# Patient Record
Sex: FEMALE | Race: WHITE | HISPANIC OR LATINO | Employment: OTHER | URBAN - METROPOLITAN AREA
[De-identification: names, ages, dates, MRNs, and addresses within clinical notes are randomized per-mention and may not be internally consistent; named-entity substitution may affect disease eponyms.]

---

## 2020-10-29 ENCOUNTER — OFFICE VISIT (OUTPATIENT)
Dept: URBAN - METROPOLITAN AREA CLINIC 27 | Facility: CLINIC | Age: 70
End: 2020-10-29

## 2020-10-29 PROBLEM — 442461003 HISTORY OF EXCISION OF INTESTINAL STRUCTURE: Status: ACTIVE | Noted: 2020-10-29

## 2020-10-29 PROBLEM — 79922009 EPIGASTRIC PAIN: Status: ACTIVE | Noted: 2020-10-29

## 2020-10-29 PROBLEM — 422587007 NAUSEA: Status: ACTIVE | Noted: 2020-10-29

## 2020-10-30 ENCOUNTER — LAB OUTSIDE AN ENCOUNTER (OUTPATIENT)
Dept: URBAN - METROPOLITAN AREA CLINIC 121 | Facility: CLINIC | Age: 70
End: 2020-10-30

## 2020-10-30 LAB — HGBA1C: (no result)

## 2020-11-19 ENCOUNTER — OFFICE VISIT (OUTPATIENT)
Dept: URBAN - METROPOLITAN AREA SURGERY CENTER 7 | Facility: SURGERY CENTER | Age: 70
End: 2020-11-19

## 2021-01-12 ENCOUNTER — OFFICE VISIT (OUTPATIENT)
Dept: URBAN - METROPOLITAN AREA CLINIC 27 | Facility: CLINIC | Age: 71
End: 2021-01-12

## 2021-03-15 ENCOUNTER — OFFICE VISIT (OUTPATIENT)
Dept: URBAN - METROPOLITAN AREA CLINIC 27 | Facility: CLINIC | Age: 71
End: 2021-03-15

## 2021-03-22 ENCOUNTER — OFFICE VISIT (OUTPATIENT)
Dept: URBAN - METROPOLITAN AREA SURGERY CENTER 7 | Facility: SURGERY CENTER | Age: 71
End: 2021-03-22

## 2021-04-27 ENCOUNTER — OFFICE VISIT (OUTPATIENT)
Dept: URBAN - METROPOLITAN AREA CLINIC 27 | Facility: CLINIC | Age: 71
End: 2021-04-27

## 2022-04-30 ENCOUNTER — TELEPHONE ENCOUNTER (OUTPATIENT)
Dept: URBAN - METROPOLITAN AREA CLINIC 121 | Facility: CLINIC | Age: 72
End: 2022-04-30

## 2022-04-30 RX ORDER — CYANOCOBALAMIN (VITAMIN B-12) 2500 MCG
QD TABLET, SUBLINGUAL SUBLINGUAL
OUTPATIENT
Start: 2014-03-25

## 2022-04-30 RX ORDER — EMPAGLIFLOZIN 25 MG/1
TABLET, FILM COATED ORAL
OUTPATIENT
Start: 2016-03-23 | End: 2020-10-29

## 2022-04-30 RX ORDER — GLYBURIDE 5 MG/1
TABLET ORAL
OUTPATIENT
Start: 2007-11-19

## 2022-04-30 RX ORDER — ESOMEPRAZOLE MAGNESIUM 40 MG
1 PO BID CAPSULE,DELAYED RELEASE (ENTERIC COATED) ORAL
OUTPATIENT
Start: 2007-11-28

## 2022-04-30 RX ORDER — RALOXIFENE HYDROCHLORIDE 60 MG/1
TABLET, FILM COATED ORAL
OUTPATIENT
Start: 2016-03-23

## 2022-04-30 RX ORDER — FLUOXETINE HYDROCHLORIDE 20 MG/1
TABLET ORAL
OUTPATIENT
Start: 2007-11-19

## 2022-04-30 RX ORDER — ESTRADIOL 1 MG/1
TABLET ORAL
OUTPATIENT
Start: 2007-11-19

## 2022-04-30 RX ORDER — ESTRADIOL 1 MG/1
TABLET ORAL
OUTPATIENT
Start: 2007-11-19 | End: 2013-11-21

## 2022-04-30 RX ORDER — PRAVASTATIN SODIUM 10 MG/1
TABLET ORAL
OUTPATIENT
Start: 2020-10-29 | End: 2021-03-15

## 2022-04-30 RX ORDER — MULTIVIT-MIN/FA/LYCOPEN/LUTEIN .4-300-25
TABLET ORAL
OUTPATIENT
Start: 2007-11-19

## 2022-04-30 RX ORDER — ESOMEPRAZOLE MAGNESIUM 40 MG
CAPSULE,DELAYED RELEASE (ENTERIC COATED) ORAL
OUTPATIENT
Start: 2007-11-19

## 2022-04-30 RX ORDER — CYANOCOBALAMIN (VITAMIN B-12) 2500 MCG
QD TABLET, SUBLINGUAL SUBLINGUAL
OUTPATIENT
Start: 2014-03-25 | End: 2020-10-29

## 2022-04-30 RX ORDER — RALOXIFENE HYDROCHLORIDE 60 MG/1
TABLET, FILM COATED ORAL
OUTPATIENT
Start: 2016-03-23 | End: 2020-10-29

## 2022-04-30 RX ORDER — CYANOCOBALAMIN 1000 UG/ML
INJECTION INTRAMUSCULAR; SUBCUTANEOUS
OUTPATIENT
Start: 2016-03-23 | End: 2020-10-29

## 2022-04-30 RX ORDER — LUTEIN 18.6MG
CAPSULE ORAL
OUTPATIENT
Start: 2008-02-14 | End: 2013-11-21

## 2022-04-30 RX ORDER — ESOMEPRAZOLE MAGNESIUM 40 MG
1 PO BID CAPSULE,DELAYED RELEASE (ENTERIC COATED) ORAL
OUTPATIENT
Start: 2007-11-28 | End: 2013-11-21

## 2022-04-30 RX ORDER — CYANOCOBALAMIN 1000 UG/ML
INJECTION INTRAMUSCULAR; SUBCUTANEOUS
OUTPATIENT
Start: 2016-03-23

## 2022-04-30 RX ORDER — EMPAGLIFLOZIN 25 MG/1
TABLET, FILM COATED ORAL
OUTPATIENT
Start: 2016-03-23

## 2022-04-30 RX ORDER — OXYBUTYNIN CHLORIDE 10 MG/1
TABLET, EXTENDED RELEASE ORAL
OUTPATIENT
Start: 2016-03-23 | End: 2020-10-29

## 2022-04-30 RX ORDER — LUTEIN 18.6MG
CAPSULE ORAL
OUTPATIENT
Start: 2008-02-14

## 2022-04-30 RX ORDER — PRAVASTATIN SODIUM 10 MG/1
TABLET ORAL
OUTPATIENT
Start: 2020-10-29

## 2022-04-30 RX ORDER — METFORMIN HCL 500 MG/1
TABLET ORAL
OUTPATIENT
Start: 2007-11-19

## 2022-04-30 RX ORDER — GLYBURIDE 5 MG/1
TABLET ORAL
OUTPATIENT
Start: 2007-11-19 | End: 2013-11-21

## 2022-04-30 RX ORDER — CYANOCOBALAMIN (VITAMIN B-12) 2500 MCG
TABLET, SUBLINGUAL SUBLINGUAL
OUTPATIENT
Start: 2007-11-19

## 2022-04-30 RX ORDER — OXYBUTYNIN CHLORIDE 10 MG/1
TABLET, EXTENDED RELEASE ORAL
OUTPATIENT
Start: 2016-03-23

## 2022-05-01 ENCOUNTER — TELEPHONE ENCOUNTER (OUTPATIENT)
Dept: URBAN - METROPOLITAN AREA CLINIC 121 | Facility: CLINIC | Age: 72
End: 2022-05-01

## 2022-05-01 RX ORDER — LIRAGLUTIDE 6 MG/ML
BID INJECTION SUBCUTANEOUS
Status: ACTIVE | COMMUNITY
Start: 2013-11-21

## 2022-05-01 RX ORDER — PANTOPRAZOLE SODIUM 40 MG/1
TAKE 1 TABLET DAILY 30 MINUTES BEFORE BREAKFAST TABLET, DELAYED RELEASE ORAL
Status: ACTIVE | COMMUNITY
Start: 2021-12-03

## 2022-05-01 RX ORDER — OMEGA-3S/DHA/EPA/FISH OIL 980-1400MG
CAPSULE,DELAYED RELEASE (ENTERIC COATED) ORAL
Status: ACTIVE | COMMUNITY
Start: 2020-10-29

## 2022-05-01 RX ORDER — BIOTIN 10 MG
TABLET ORAL
Status: ACTIVE | COMMUNITY
Start: 2016-03-23

## 2022-05-01 RX ORDER — DENOSUMAB 60 MG/ML
INJECTION SUBCUTANEOUS
Status: ACTIVE | COMMUNITY
Start: 2020-10-29

## 2022-05-01 RX ORDER — ATORVASTATIN CALCIUM 40 MG/1
TABLET, FILM COATED ORAL
Status: ACTIVE | COMMUNITY
Start: 2021-01-12

## 2022-05-01 RX ORDER — FLUOXETINE HYDROCHLORIDE 20 MG/1
QD TABLET ORAL
Status: ACTIVE | COMMUNITY
Start: 2014-03-25

## 2022-05-01 RX ORDER — LINACLOTIDE 145 UG/1
1 CAPSULE PO QAM 30 MINS BEFORE EATING CAPSULE, GELATIN COATED ORAL
Status: ACTIVE | COMMUNITY
Start: 2021-01-19

## 2022-05-01 RX ORDER — VIT C/HESPERIDIN/BIOFLAVONOIDS 500-100 MG
TABLET ORAL
Status: ACTIVE | COMMUNITY
Start: 2020-10-29

## 2022-05-01 RX ORDER — ISOSORBIDE DINITRATE 30 MG/1
TABLET ORAL
Status: ACTIVE | COMMUNITY
Start: 2021-01-12

## 2022-05-01 RX ORDER — CHOLECALCIFEROL (VITAMIN D3) 125 MCG
CAPSULE ORAL
Status: ACTIVE | COMMUNITY
Start: 2020-10-29

## 2022-05-01 RX ORDER — ONDANSETRON HYDROCHLORIDE 4 MG/1
1 TABLET PO Q6H PRN FOR NAUSEA TABLET, FILM COATED ORAL
Status: ACTIVE | COMMUNITY
Start: 2020-10-29

## 2022-05-01 RX ORDER — LINACLOTIDE 145 UG/1
TAKE 1 CAPSULE EVERY MORNING 30 MINUTES BEFORE EATING CAPSULE, GELATIN COATED ORAL
Status: ACTIVE | COMMUNITY
Start: 2022-02-01

## 2022-05-01 RX ORDER — METFORMIN HCL 500 MG/1
QD TABLET ORAL
Status: ACTIVE | COMMUNITY
Start: 2014-03-25

## 2022-05-01 RX ORDER — MULTIVIT-MIN/FA/LYCOPEN/LUTEIN .4-300-25
QD TABLET ORAL
Status: ACTIVE | COMMUNITY
Start: 2014-03-25

## 2022-06-07 ENCOUNTER — TELEPHONE ENCOUNTER (OUTPATIENT)
Dept: URBAN - METROPOLITAN AREA CLINIC 27 | Facility: CLINIC | Age: 72
End: 2022-06-07

## 2022-06-07 RX ORDER — LINACLOTIDE 145 UG/1
TAKE 1 CAPSULE EVERY MORNING 30 MINUTES BEFORE EATING CAPSULE, GELATIN COATED ORAL ONCE A DAY
Qty: 90 | Refills: 3
Start: 2022-02-01 | End: 2023-06-02

## 2022-08-26 ENCOUNTER — TELEPHONE ENCOUNTER (OUTPATIENT)
Dept: URBAN - METROPOLITAN AREA CLINIC 27 | Facility: CLINIC | Age: 72
End: 2022-08-26

## 2022-10-31 ENCOUNTER — LAB OUTSIDE AN ENCOUNTER (OUTPATIENT)
Dept: URBAN - METROPOLITAN AREA CLINIC 27 | Facility: CLINIC | Age: 72
End: 2022-10-31

## 2022-10-31 ENCOUNTER — WEB ENCOUNTER (OUTPATIENT)
Dept: URBAN - METROPOLITAN AREA CLINIC 27 | Facility: CLINIC | Age: 72
End: 2022-10-31

## 2022-10-31 ENCOUNTER — OFFICE VISIT (OUTPATIENT)
Dept: URBAN - METROPOLITAN AREA CLINIC 27 | Facility: CLINIC | Age: 72
End: 2022-10-31
Payer: MEDICARE

## 2022-10-31 VITALS
SYSTOLIC BLOOD PRESSURE: 120 MMHG | RESPIRATION RATE: 17 BRPM | WEIGHT: 117 LBS | HEART RATE: 72 BPM | HEIGHT: 59 IN | DIASTOLIC BLOOD PRESSURE: 68 MMHG | BODY MASS INDEX: 23.59 KG/M2

## 2022-10-31 DIAGNOSIS — R19.4 CHANGE IN BOWEL HABITS: ICD-10-CM

## 2022-10-31 DIAGNOSIS — M54.50 LOW BACK PAIN, UNSPECIFIED BACK PAIN LATERALITY, UNSPECIFIED CHRONICITY, UNSPECIFIED WHETHER SCIATICA PRESENT: ICD-10-CM

## 2022-10-31 DIAGNOSIS — Z86.010 PERSONAL HISTORY OF COLONIC POLYPS: ICD-10-CM

## 2022-10-31 PROCEDURE — 99214 OFFICE O/P EST MOD 30 MIN: CPT | Performed by: PHYSICIAN ASSISTANT

## 2022-10-31 RX ORDER — CHOLECALCIFEROL (VITAMIN D3) 125 MCG
CAPSULE ORAL
Status: ACTIVE | COMMUNITY
Start: 2020-10-29

## 2022-10-31 RX ORDER — FLUOXETINE HYDROCHLORIDE 20 MG/1
QD TABLET ORAL
Status: ACTIVE | COMMUNITY
Start: 2014-03-25

## 2022-10-31 RX ORDER — OMEGA-3S/DHA/EPA/FISH OIL 980-1400MG
CAPSULE,DELAYED RELEASE (ENTERIC COATED) ORAL
Status: ACTIVE | COMMUNITY
Start: 2020-10-29

## 2022-10-31 RX ORDER — ISOSORBIDE DINITRATE 30 MG/1
TABLET ORAL
Status: ACTIVE | COMMUNITY
Start: 2021-01-12

## 2022-10-31 RX ORDER — LINACLOTIDE 145 UG/1
TAKE 1 CAPSULE EVERY MORNING 30 MINUTES BEFORE EATING CAPSULE, GELATIN COATED ORAL ONCE A DAY
Qty: 90 | Refills: 3 | Status: ACTIVE | COMMUNITY
Start: 2022-02-01 | End: 2023-06-02

## 2022-10-31 RX ORDER — ONDANSETRON HYDROCHLORIDE 4 MG/1
1 TABLET PO Q6H PRN FOR NAUSEA TABLET, FILM COATED ORAL
Status: ACTIVE | COMMUNITY
Start: 2020-10-29

## 2022-10-31 RX ORDER — DENOSUMAB 60 MG/ML
INJECTION SUBCUTANEOUS
Status: ACTIVE | COMMUNITY
Start: 2020-10-29

## 2022-10-31 RX ORDER — VIT C/HESPERIDIN/BIOFLAVONOIDS 500-100 MG
TABLET ORAL
Status: ACTIVE | COMMUNITY
Start: 2020-10-29

## 2022-10-31 RX ORDER — PANTOPRAZOLE SODIUM 40 MG/1
TAKE 1 TABLET DAILY 30 MINUTES BEFORE BREAKFAST TABLET, DELAYED RELEASE ORAL
Status: ACTIVE | COMMUNITY
Start: 2021-12-03

## 2022-10-31 RX ORDER — MULTIVIT-MIN/FA/LYCOPEN/LUTEIN .4-300-25
QD TABLET ORAL
Status: ACTIVE | COMMUNITY
Start: 2014-03-25

## 2022-10-31 RX ORDER — LIRAGLUTIDE 6 MG/ML
BID INJECTION SUBCUTANEOUS
Status: ACTIVE | COMMUNITY
Start: 2013-11-21

## 2022-10-31 RX ORDER — ATORVASTATIN CALCIUM 40 MG/1
TABLET, FILM COATED ORAL
Status: ACTIVE | COMMUNITY
Start: 2021-01-12

## 2022-10-31 RX ORDER — METFORMIN HCL 500 MG/1
QD TABLET ORAL
Status: ACTIVE | COMMUNITY
Start: 2014-03-25

## 2022-10-31 RX ORDER — BIOTIN 10 MG
TABLET ORAL
Status: ACTIVE | COMMUNITY
Start: 2016-03-23

## 2022-10-31 NOTE — HPI-TODAY'S VISIT:
Ms. Zazueta is a 72-year-old female patient of Dr. Cano presenting for evaluation of severe back pain. She developed back pain after moving houses, lifting heavy boxes in early September. She went to her PCP, had spinal x-rays (normal) and was prescribed a muscle relaxer. Her upper/mid back pain has resolved, she continues to have sharp, intermittent lower back pain. Pain is worse standing up straight and certain mvmts. Pain improves if she passes a BM or gas, so she thinks there may be a GI etiology. She is using a cane for fear the pain may make her fall. Constipation is well-controlled on Linzess 145 QD, though recently she has had mushy, sticky, formless stools. No rectal bleeding or weight loss. She recently started a new med for osteoporosis (Evenity). I have reviewed labs from Sept. showing normal CMP, A1c of 6.7.  She is s/p gastric bypass in 2001 and prior cholecystectomy.    Colonoscopy March 2021:1 small polyp; 3-year recall with extended prep given his serrated adenoma on prior colonoscopy EGD 2020:Normal anastomosis UGI and SBFT March 2021:Mild reflux, otherwise unremarkable. CT ab/pel November 2020:Gastric bypass, small hiatal hernia, constipation

## 2022-11-02 ENCOUNTER — ERX REFILL RESPONSE (OUTPATIENT)
Dept: URBAN - METROPOLITAN AREA CLINIC 27 | Facility: CLINIC | Age: 72
End: 2022-11-02

## 2022-11-02 RX ORDER — PANTOPRAZOLE SODIUM 40 MG/1
TAKE 1 TABLET DAILY 30 MINUTES BEFORE BREAKFAST TABLET, DELAYED RELEASE ORAL
Qty: 90 TABLET | Refills: 4 | OUTPATIENT

## 2022-11-02 RX ORDER — PANTOPRAZOLE SODIUM 40 MG/1
TAKE 1 TABLET DAILY 30 MINUTES BEFORE BREAKFAST TABLET, DELAYED RELEASE ORAL
Qty: 90 TABLET | Refills: 3 | OUTPATIENT

## 2022-11-03 ENCOUNTER — LAB OUTSIDE AN ENCOUNTER (OUTPATIENT)
Dept: URBAN - METROPOLITAN AREA CLINIC 27 | Facility: CLINIC | Age: 72
End: 2022-11-03

## 2022-11-08 LAB — OCCULT BLOOD, FECAL, IA: (no result)

## 2022-11-15 ENCOUNTER — WEB ENCOUNTER (OUTPATIENT)
Dept: URBAN - METROPOLITAN AREA CLINIC 27 | Facility: CLINIC | Age: 72
End: 2022-11-15

## 2022-11-17 ENCOUNTER — OFFICE VISIT (OUTPATIENT)
Dept: URBAN - METROPOLITAN AREA CLINIC 27 | Facility: CLINIC | Age: 72
End: 2022-11-17
Payer: MEDICARE

## 2022-11-17 VITALS
WEIGHT: 106 LBS | HEART RATE: 79 BPM | BODY MASS INDEX: 21.37 KG/M2 | SYSTOLIC BLOOD PRESSURE: 130 MMHG | HEIGHT: 59 IN | DIASTOLIC BLOOD PRESSURE: 70 MMHG

## 2022-11-17 DIAGNOSIS — Z98.84 BARIATRIC SURGERY STATUS: ICD-10-CM

## 2022-11-17 DIAGNOSIS — K21.9 GASTRO-ESOPHAGEAL REFLUX DISEASE WITHOUT ESOPHAGITIS: ICD-10-CM

## 2022-11-17 DIAGNOSIS — R19.4 CHANGE IN BOWEL HABITS: ICD-10-CM

## 2022-11-17 PROCEDURE — 99213 OFFICE O/P EST LOW 20 MIN: CPT | Performed by: INTERNAL MEDICINE

## 2022-11-17 RX ORDER — CHOLECALCIFEROL (VITAMIN D3) 125 MCG
CAPSULE ORAL
Status: ACTIVE | COMMUNITY
Start: 2020-10-29

## 2022-11-17 RX ORDER — BIOTIN 10 MG
TABLET ORAL
Status: ACTIVE | COMMUNITY
Start: 2016-03-23

## 2022-11-17 RX ORDER — LINACLOTIDE 145 UG/1
TAKE 1 CAPSULE EVERY MORNING 30 MINUTES BEFORE EATING CAPSULE, GELATIN COATED ORAL ONCE A DAY
Qty: 90 | Refills: 3 | Status: ACTIVE | COMMUNITY
Start: 2022-02-01 | End: 2023-06-02

## 2022-11-17 RX ORDER — PANTOPRAZOLE SODIUM 40 MG/1
TAKE 1 TABLET DAILY 30 MINUTES BEFORE BREAKFAST TABLET, DELAYED RELEASE ORAL
Qty: 90 TABLET | Refills: 3 | Status: ACTIVE | COMMUNITY

## 2022-11-17 RX ORDER — LIRAGLUTIDE 6 MG/ML
BID INJECTION SUBCUTANEOUS
Status: ACTIVE | COMMUNITY
Start: 2013-11-21

## 2022-11-17 RX ORDER — OMEGA-3S/DHA/EPA/FISH OIL 980-1400MG
CAPSULE,DELAYED RELEASE (ENTERIC COATED) ORAL
Status: ACTIVE | COMMUNITY
Start: 2020-10-29

## 2022-11-17 RX ORDER — VIT C/HESPERIDIN/BIOFLAVONOIDS 500-100 MG
TABLET ORAL
Status: ACTIVE | COMMUNITY
Start: 2020-10-29

## 2022-11-17 RX ORDER — ATORVASTATIN CALCIUM 40 MG/1
TABLET, FILM COATED ORAL
Status: ACTIVE | COMMUNITY
Start: 2021-01-12

## 2022-11-17 RX ORDER — ISOSORBIDE DINITRATE 30 MG/1
TABLET ORAL
Status: ACTIVE | COMMUNITY
Start: 2021-01-12

## 2022-11-17 RX ORDER — ONDANSETRON HYDROCHLORIDE 4 MG/1
1 TABLET PO Q6H PRN FOR NAUSEA TABLET, FILM COATED ORAL
Status: ACTIVE | COMMUNITY
Start: 2020-10-29

## 2022-11-17 RX ORDER — DENOSUMAB 60 MG/ML
INJECTION SUBCUTANEOUS
Status: ACTIVE | COMMUNITY
Start: 2020-10-29

## 2022-11-17 RX ORDER — FLUOXETINE HYDROCHLORIDE 20 MG/1
QD TABLET ORAL
Status: ACTIVE | COMMUNITY
Start: 2014-03-25

## 2022-11-17 RX ORDER — MULTIVIT-MIN/FA/LYCOPEN/LUTEIN .4-300-25
QD TABLET ORAL
Status: ACTIVE | COMMUNITY
Start: 2014-03-25

## 2022-11-17 RX ORDER — METFORMIN HCL 500 MG/1
QD TABLET ORAL
Status: ACTIVE | COMMUNITY
Start: 2014-03-25

## 2022-11-17 NOTE — EXAM-PHYSICAL EXAM
Palpation in the area of the lower back above the coccygeal region is where she is tender but there is no point tenderness

## 2022-11-17 NOTE — HPI-TODAY'S VISIT:
ThisIs a 72-year-old female seen in consultation for her stabbing pain in her lower back above her coccyx region.  She had an MRI of the pelvis which showed arthritis.  And some changes within the spinal column.  She states the discomfort is worse when stretching such as sitting up or sitting down.  Or movement.  It all began when she was moving a box and turning.  She has chronic constipation and uses Linzess.  She does feel that sometimes with a good bowel movement she will have some relief of the pressure.  She has reflux on PPI which is stable.  Her bowel movements have not changed recently.  Last colonoscopy 2021.  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

## 2022-11-18 PROBLEM — 129851009: Status: ACTIVE | Noted: 2022-10-31

## 2022-11-18 PROBLEM — 608848006 HISTORY OF BARIATRIC SURGICAL PROCEDURE: Status: ACTIVE | Noted: 2020-10-29

## 2023-06-29 ENCOUNTER — ERX REFILL RESPONSE (OUTPATIENT)
Dept: URBAN - METROPOLITAN AREA CLINIC 27 | Facility: CLINIC | Age: 73
End: 2023-06-29

## 2023-06-29 RX ORDER — LINACLOTIDE 145 UG/1
TAKE 1 CAPSULE DAILY EVERY MORNING 30 MINUTES BEFORE EATING CAPSULE, GELATIN COATED ORAL
Qty: 90 CAPSULE | Refills: 4 | OUTPATIENT

## 2023-06-29 RX ORDER — LINACLOTIDE 145 UG/1
TAKE 1 CAPSULE DAILY EVERY MORNING 30 MINUTES BEFORE EATING CAPSULE, GELATIN COATED ORAL
Qty: 90 CAPSULE | Refills: 3 | OUTPATIENT

## 2023-08-24 ENCOUNTER — ERX REFILL RESPONSE (OUTPATIENT)
Dept: URBAN - METROPOLITAN AREA CLINIC 27 | Facility: CLINIC | Age: 73
End: 2023-08-24

## 2023-08-24 RX ORDER — PANTOPRAZOLE SODIUM 40 MG/1
TAKE 1 TABLET DAILY 30 MINUTES BEFORE BREAKFAST TABLET, DELAYED RELEASE ORAL
Qty: 90 TABLET | Refills: 3 | OUTPATIENT

## 2023-09-08 ENCOUNTER — TELEPHONE ENCOUNTER (OUTPATIENT)
Dept: URBAN - METROPOLITAN AREA CLINIC 27 | Facility: CLINIC | Age: 73
End: 2023-09-08

## 2024-04-16 ENCOUNTER — OV EP (OUTPATIENT)
Dept: URBAN - METROPOLITAN AREA CLINIC 27 | Facility: CLINIC | Age: 74
End: 2024-04-16
Payer: MEDICARE

## 2024-04-16 VITALS
HEART RATE: 66 BPM | DIASTOLIC BLOOD PRESSURE: 75 MMHG | HEIGHT: 59 IN | BODY MASS INDEX: 26.81 KG/M2 | WEIGHT: 133 LBS | SYSTOLIC BLOOD PRESSURE: 122 MMHG

## 2024-04-16 DIAGNOSIS — K21.9 GASTRO-ESOPHAGEAL REFLUX DISEASE WITHOUT ESOPHAGITIS: ICD-10-CM

## 2024-04-16 DIAGNOSIS — K92.2 GASTROINTESTINAL HEMORRHAGE, UNSPECIFIED: ICD-10-CM

## 2024-04-16 DIAGNOSIS — K30 FUNCTIONAL DYSPEPSIA: ICD-10-CM

## 2024-04-16 DIAGNOSIS — E11.9 TYPE 2 DIABETES MELLITUS WITHOUT COMPLICATIONS: ICD-10-CM

## 2024-04-16 PROCEDURE — 99214 OFFICE O/P EST MOD 30 MIN: CPT | Performed by: INTERNAL MEDICINE

## 2024-04-16 RX ORDER — PANTOPRAZOLE SODIUM 40 MG/1
1 TABLET TABLET, DELAYED RELEASE ORAL
Qty: 90 | Refills: 3 | Status: ACTIVE | COMMUNITY

## 2024-04-16 RX ORDER — MULTIVIT-MIN/FA/LYCOPEN/LUTEIN .4-300-25
QD TABLET ORAL
Status: ACTIVE | COMMUNITY
Start: 2014-03-25

## 2024-04-16 RX ORDER — ATORVASTATIN CALCIUM 40 MG/1
TABLET, FILM COATED ORAL
Status: ACTIVE | COMMUNITY
Start: 2021-01-12

## 2024-04-16 RX ORDER — LINACLOTIDE 145 UG/1
TAKE 1 CAPSULE DAILY EVERY MORNING 30 MINUTES BEFORE EATING CAPSULE, GELATIN COATED ORAL ONCE A DAY
Qty: 30 | Refills: 3 | Status: ACTIVE | COMMUNITY

## 2024-04-16 RX ORDER — ISOSORBIDE DINITRATE 30 MG/1
TABLET ORAL
Status: ACTIVE | COMMUNITY
Start: 2021-01-12

## 2024-04-16 RX ORDER — DENOSUMAB 60 MG/ML
INJECTION SUBCUTANEOUS
Status: ACTIVE | COMMUNITY
Start: 2020-10-29

## 2024-04-16 RX ORDER — BIOTIN 10 MG
TABLET ORAL
Status: ACTIVE | COMMUNITY
Start: 2016-03-23

## 2024-04-16 RX ORDER — VIT C/HESPERIDIN/BIOFLAVONOIDS 500-100 MG
TABLET ORAL
Status: ACTIVE | COMMUNITY
Start: 2020-10-29

## 2024-04-16 RX ORDER — METFORMIN HCL 500 MG/1
QD TABLET ORAL
Status: ACTIVE | COMMUNITY
Start: 2014-03-25

## 2024-04-16 RX ORDER — OMEGA-3S/DHA/EPA/FISH OIL 980-1400MG
CAPSULE,DELAYED RELEASE (ENTERIC COATED) ORAL
Status: ACTIVE | COMMUNITY
Start: 2020-10-29

## 2024-04-16 RX ORDER — GLIMEPIRIDE 4 MG/1
1 TABLET WITH BREAKFAST OR THE FIRST MAIN MEAL OF THE DAY TABLET ORAL ONCE A DAY
Status: ACTIVE | COMMUNITY

## 2024-04-16 RX ORDER — CHOLECALCIFEROL (VITAMIN D3) 125 MCG
CAPSULE ORAL
Status: ACTIVE | COMMUNITY
Start: 2020-10-29

## 2024-04-16 RX ORDER — FLUOXETINE HYDROCHLORIDE 20 MG/1
QD TABLET ORAL
Status: ACTIVE | COMMUNITY
Start: 2014-03-25

## 2024-04-16 RX ORDER — FLUOXETINE HYDROCHLORIDE 20 MG/1
1 CAPSULE CAPSULE ORAL ONCE A DAY
Status: ACTIVE | COMMUNITY

## 2024-04-16 RX ORDER — ONDANSETRON HYDROCHLORIDE 4 MG/1
1 TABLET PO Q6H PRN FOR NAUSEA TABLET, FILM COATED ORAL
Status: ACTIVE | COMMUNITY
Start: 2020-10-29

## 2024-04-16 RX ORDER — CALCITRIOL 0.25 UG/1
1 CAPSULE CAPSULE ORAL
Status: ACTIVE | COMMUNITY

## 2024-04-16 RX ORDER — LIRAGLUTIDE 6 MG/ML
BID INJECTION SUBCUTANEOUS
Status: ACTIVE | COMMUNITY
Start: 2013-11-21

## 2024-04-16 NOTE — HPI-TODAY'S VISIT:
This is a 73-year-old female seen in consultation for her GI issues.  She has gained some weight.  She is eating better.  She is feeling better.  She recent went to [facility].  She is taking metformin glimepiride Victoza pantoprazole Linzess.  Her reflux is under good control she is going to the bathroom much better with the Linzess.  She has a history of gallstones.  Previous bariatric surgery.  Last colonoscopy 2021 with recommendation for follow-up history of tubular adenoma and sessile serrated adenoma.  She had cervical spine surgery last year

## 2024-04-18 ENCOUNTER — LAB (OUTPATIENT)
Dept: URBAN - METROPOLITAN AREA CLINIC 27 | Facility: CLINIC | Age: 74
End: 2024-04-18

## 2024-05-23 ENCOUNTER — OUT OF OFFICE VISIT (OUTPATIENT)
Dept: URBAN - METROPOLITAN AREA SURGERY CENTER 7 | Facility: SURGERY CENTER | Age: 74
End: 2024-05-23
Payer: MEDICARE

## 2024-05-23 DIAGNOSIS — Z09 ENCOUNTER FOR FOLLOW-UP: ICD-10-CM

## 2024-05-23 DIAGNOSIS — K64.8 OTHER HEMORRHOIDS: ICD-10-CM

## 2024-05-23 DIAGNOSIS — Z09 ENCNTR FOR F/U EXAM AFT TRTMT FOR COND OTH THAN MALIG NEOPLM: ICD-10-CM

## 2024-05-23 DIAGNOSIS — Z86.010 ADENOMAS PERSONAL HISTORY OF COLONIC POLYPS: ICD-10-CM

## 2024-05-23 DIAGNOSIS — Z86.010 PERSONAL HISTORY OF COLON POLYPS: ICD-10-CM

## 2024-05-23 PROCEDURE — G0105 COLORECTAL SCRN; HI RISK IND: HCPCS | Performed by: CLINIC/CENTER

## 2024-05-23 PROCEDURE — 00811 ANES LWR INTST NDSC NOS: CPT | Performed by: NURSE ANESTHETIST, CERTIFIED REGISTERED

## 2024-05-23 PROCEDURE — G8907 PT DOC NO EVENTS ON DISCHARG: HCPCS | Performed by: CLINIC/CENTER

## 2024-05-23 PROCEDURE — G0105 COLORECTAL SCRN; HI RISK IND: HCPCS | Performed by: INTERNAL MEDICINE

## 2024-05-23 RX ORDER — FLUOXETINE HYDROCHLORIDE 20 MG/1
1 CAPSULE CAPSULE ORAL ONCE A DAY
Status: ACTIVE | COMMUNITY

## 2024-05-23 RX ORDER — ATORVASTATIN CALCIUM 40 MG/1
TABLET, FILM COATED ORAL
Status: ACTIVE | COMMUNITY
Start: 2021-01-12

## 2024-05-23 RX ORDER — ISOSORBIDE DINITRATE 30 MG/1
TABLET ORAL
Status: ACTIVE | COMMUNITY
Start: 2021-01-12

## 2024-05-23 RX ORDER — PANTOPRAZOLE SODIUM 40 MG/1
1 TABLET TABLET, DELAYED RELEASE ORAL
Qty: 90 | Refills: 3 | Status: ACTIVE | COMMUNITY

## 2024-05-23 RX ORDER — LIRAGLUTIDE 6 MG/ML
BID INJECTION SUBCUTANEOUS
Status: ACTIVE | COMMUNITY
Start: 2013-11-21

## 2024-05-23 RX ORDER — OMEGA-3S/DHA/EPA/FISH OIL 980-1400MG
CAPSULE,DELAYED RELEASE (ENTERIC COATED) ORAL
Status: ACTIVE | COMMUNITY
Start: 2020-10-29

## 2024-05-23 RX ORDER — MULTIVIT-MIN/FA/LYCOPEN/LUTEIN .4-300-25
QD TABLET ORAL
Status: ACTIVE | COMMUNITY
Start: 2014-03-25

## 2024-05-23 RX ORDER — CHOLECALCIFEROL (VITAMIN D3) 125 MCG
CAPSULE ORAL
Status: ACTIVE | COMMUNITY
Start: 2020-10-29

## 2024-05-23 RX ORDER — VIT C/HESPERIDIN/BIOFLAVONOIDS 500-100 MG
TABLET ORAL
Status: ACTIVE | COMMUNITY
Start: 2020-10-29

## 2024-05-23 RX ORDER — DENOSUMAB 60 MG/ML
INJECTION SUBCUTANEOUS
Status: ACTIVE | COMMUNITY
Start: 2020-10-29

## 2024-05-23 RX ORDER — GLIMEPIRIDE 4 MG/1
1 TABLET WITH BREAKFAST OR THE FIRST MAIN MEAL OF THE DAY TABLET ORAL ONCE A DAY
Status: ACTIVE | COMMUNITY

## 2024-05-23 RX ORDER — LINACLOTIDE 145 UG/1
TAKE 1 CAPSULE DAILY EVERY MORNING 30 MINUTES BEFORE EATING CAPSULE, GELATIN COATED ORAL ONCE A DAY
Qty: 30 | Refills: 3 | Status: ACTIVE | COMMUNITY

## 2024-05-23 RX ORDER — ONDANSETRON HYDROCHLORIDE 4 MG/1
1 TABLET PO Q6H PRN FOR NAUSEA TABLET, FILM COATED ORAL
Status: ACTIVE | COMMUNITY
Start: 2020-10-29

## 2024-05-23 RX ORDER — METFORMIN HCL 500 MG/1
QD TABLET ORAL
Status: ACTIVE | COMMUNITY
Start: 2014-03-25

## 2024-05-23 RX ORDER — FLUOXETINE HYDROCHLORIDE 20 MG/1
QD TABLET ORAL
Status: ACTIVE | COMMUNITY
Start: 2014-03-25

## 2024-05-23 RX ORDER — BIOTIN 10 MG
TABLET ORAL
Status: ACTIVE | COMMUNITY
Start: 2016-03-23

## 2024-05-23 RX ORDER — CALCITRIOL 0.25 UG/1
1 CAPSULE CAPSULE ORAL
Status: ACTIVE | COMMUNITY

## 2024-09-06 ENCOUNTER — TELEPHONE ENCOUNTER (OUTPATIENT)
Dept: URBAN - METROPOLITAN AREA CLINIC 27 | Facility: CLINIC | Age: 74
End: 2024-09-06

## 2025-01-14 ENCOUNTER — TELEPHONE ENCOUNTER (OUTPATIENT)
Dept: URBAN - METROPOLITAN AREA CLINIC 27 | Facility: CLINIC | Age: 75
End: 2025-01-14

## 2025-01-14 RX ORDER — LINACLOTIDE 145 UG/1
TAKE 1 CAPSULE DAILY EVERY MORNING 30 MINUTES BEFORE EATING CAPSULE, GELATIN COATED ORAL ONCE A DAY
Qty: 30 | Refills: 3
End: 2025-05-14

## 2025-01-16 ENCOUNTER — TELEPHONE ENCOUNTER (OUTPATIENT)
Dept: URBAN - METROPOLITAN AREA CLINIC 29 | Facility: CLINIC | Age: 75
End: 2025-01-16

## 2025-01-23 ENCOUNTER — TELEPHONE ENCOUNTER (OUTPATIENT)
Dept: URBAN - METROPOLITAN AREA CLINIC 29 | Facility: CLINIC | Age: 75
End: 2025-01-23

## 2025-02-04 ENCOUNTER — TELEPHONE ENCOUNTER (OUTPATIENT)
Dept: URBAN - METROPOLITAN AREA CLINIC 29 | Facility: CLINIC | Age: 75
End: 2025-02-04

## 2025-02-04 RX ORDER — LINACLOTIDE 145 UG/1
TAKE 1 CAPSULE DAILY EVERY MORNING 30 MINUTES BEFORE EATING CAPSULE, GELATIN COATED ORAL ONCE A DAY
Qty: 30 | Refills: 3
End: 2025-06-04

## 2025-04-17 ENCOUNTER — ERX REFILL RESPONSE (OUTPATIENT)
Dept: URBAN - METROPOLITAN AREA CLINIC 27 | Facility: CLINIC | Age: 75
End: 2025-04-17

## 2025-04-17 RX ORDER — PANTOPRAZOLE SODIUM 40 MG/1
TAKE 1 TABLET BEFORE BREAKFAST TABLET, DELAYED RELEASE ORAL
Qty: 90 TABLET | Refills: 3

## 2025-05-29 ENCOUNTER — ERX REFILL RESPONSE (OUTPATIENT)
Dept: URBAN - METROPOLITAN AREA CLINIC 29 | Facility: CLINIC | Age: 75
End: 2025-05-29

## 2025-05-29 RX ORDER — LINACLOTIDE 145 UG/1
TAKE 1 CAPSULE DAILY EVERY MORNING 30 MINUTES BEFORE EATING CAPSULE, GELATIN COATED ORAL
Qty: 30 CAPSULE | Refills: 11